# Patient Record
Sex: MALE | Race: WHITE | NOT HISPANIC OR LATINO | Employment: FULL TIME | URBAN - METROPOLITAN AREA
[De-identification: names, ages, dates, MRNs, and addresses within clinical notes are randomized per-mention and may not be internally consistent; named-entity substitution may affect disease eponyms.]

---

## 2021-04-05 ENCOUNTER — APPOINTMENT (OUTPATIENT)
Dept: CT IMAGING | Facility: CLINIC | Age: 36
End: 2021-04-05
Attending: NURSE PRACTITIONER
Payer: COMMERCIAL

## 2021-04-05 ENCOUNTER — HOSPITAL ENCOUNTER (EMERGENCY)
Facility: CLINIC | Age: 36
Discharge: HOME | End: 2021-04-05
Attending: NURSE PRACTITIONER | Admitting: NURSE PRACTITIONER
Payer: COMMERCIAL

## 2021-04-05 VITALS
TEMPERATURE: 97.9 F | WEIGHT: 175 LBS | RESPIRATION RATE: 20 BRPM | DIASTOLIC BLOOD PRESSURE: 75 MMHG | SYSTOLIC BLOOD PRESSURE: 115 MMHG | OXYGEN SATURATION: 98 % | HEART RATE: 79 BPM

## 2021-04-05 DIAGNOSIS — N20.1 URETERAL STONE: ICD-10-CM

## 2021-04-05 PROBLEM — F41.9 ANXIETY AND DEPRESSION: Status: ACTIVE | Noted: 2021-04-05

## 2021-04-05 PROBLEM — M75.41 SHOULDER IMPINGEMENT SYNDROME, RIGHT: Status: ACTIVE | Noted: 2021-04-05

## 2021-04-05 PROBLEM — F32.A ANXIETY AND DEPRESSION: Status: ACTIVE | Noted: 2021-04-05

## 2021-04-05 PROBLEM — M79.605 LOW BACK PAIN RADIATING TO BOTH LEGS: Status: ACTIVE | Noted: 2019-11-01

## 2021-04-05 PROBLEM — G25.81 RESTLESS LEG SYNDROME: Status: ACTIVE | Noted: 2021-04-05

## 2021-04-05 PROBLEM — M79.604 LOW BACK PAIN RADIATING TO BOTH LEGS: Status: ACTIVE | Noted: 2019-11-01

## 2021-04-05 PROBLEM — M51.379 DDD (DEGENERATIVE DISC DISEASE), LUMBOSACRAL: Status: ACTIVE | Noted: 2019-11-01

## 2021-04-05 PROBLEM — E55.9 VITAMIN D DEFICIENCY: Status: ACTIVE | Noted: 2019-02-01

## 2021-04-05 PROBLEM — J30.2 SEASONAL ALLERGIC RHINITIS: Status: ACTIVE | Noted: 2021-04-05

## 2021-04-05 PROBLEM — M54.50 LOW BACK PAIN RADIATING TO BOTH LEGS: Status: ACTIVE | Noted: 2019-11-01

## 2021-04-05 LAB
ALBUMIN SERPL-MCNC: 4.4 G/DL (ref 3.4–5)
ALBUMIN UR-MCNC: NEGATIVE MG/DL
ALP SERPL-CCNC: 92 U/L (ref 40–150)
ALT SERPL W P-5'-P-CCNC: 17 U/L (ref 0–70)
ANION GAP SERPL CALCULATED.3IONS-SCNC: 5 MMOL/L (ref 3–14)
APPEARANCE UR: CLEAR
AST SERPL W P-5'-P-CCNC: 21 U/L (ref 0–45)
BASOPHILS # BLD AUTO: 0.2 10E9/L (ref 0–0.2)
BASOPHILS NFR BLD AUTO: 0.8 %
BILIRUB SERPL-MCNC: 0.7 MG/DL (ref 0.2–1.3)
BILIRUB UR QL STRIP: NEGATIVE
BUN SERPL-MCNC: 21 MG/DL (ref 7–30)
CALCIUM SERPL-MCNC: 9.4 MG/DL (ref 8.5–10.1)
CHLORIDE SERPL-SCNC: 108 MMOL/L (ref 94–109)
CO2 SERPL-SCNC: 27 MMOL/L (ref 20–32)
COLOR UR AUTO: YELLOW
CREAT SERPL-MCNC: 0.88 MG/DL (ref 0.66–1.25)
DIFFERENTIAL METHOD BLD: ABNORMAL
EOSINOPHIL # BLD AUTO: 0.4 10E9/L (ref 0–0.7)
EOSINOPHIL NFR BLD AUTO: 1.9 %
ERYTHROCYTE [DISTWIDTH] IN BLOOD BY AUTOMATED COUNT: 13.2 % (ref 10–15)
GFR SERPL CREATININE-BSD FRML MDRD: >90 ML/MIN/{1.73_M2}
GLUCOSE SERPL-MCNC: 91 MG/DL (ref 70–99)
GLUCOSE UR STRIP-MCNC: NEGATIVE MG/DL
HCT VFR BLD AUTO: 44 % (ref 40–53)
HGB BLD-MCNC: 15.3 G/DL (ref 13.3–17.7)
HGB UR QL STRIP: ABNORMAL
IMM GRANULOCYTES # BLD: 0.1 10E9/L (ref 0–0.4)
IMM GRANULOCYTES NFR BLD: 0.4 %
KETONES UR STRIP-MCNC: 20 MG/DL
LACTATE BLD-SCNC: 1.9 MMOL/L (ref 0.7–2)
LEUKOCYTE ESTERASE UR QL STRIP: NEGATIVE
LYMPHOCYTES # BLD AUTO: 3.5 10E9/L (ref 0.8–5.3)
LYMPHOCYTES NFR BLD AUTO: 17 %
MCH RBC QN AUTO: 31.2 PG (ref 26.5–33)
MCHC RBC AUTO-ENTMCNC: 34.8 G/DL (ref 31.5–36.5)
MCV RBC AUTO: 90 FL (ref 78–100)
MONOCYTES # BLD AUTO: 1.3 10E9/L (ref 0–1.3)
MONOCYTES NFR BLD AUTO: 6.5 %
NEUTROPHILS # BLD AUTO: 15.1 10E9/L (ref 1.6–8.3)
NEUTROPHILS NFR BLD AUTO: 73.4 %
NITRATE UR QL: NEGATIVE
NRBC # BLD AUTO: 0 10*3/UL
NRBC BLD AUTO-RTO: 0 /100
PH UR STRIP: 8 PH (ref 5–7)
PLATELET # BLD AUTO: 395 10E9/L (ref 150–450)
POTASSIUM SERPL-SCNC: 3.5 MMOL/L (ref 3.4–5.3)
PROT SERPL-MCNC: 7.8 G/DL (ref 6.8–8.8)
RBC # BLD AUTO: 4.91 10E12/L (ref 4.4–5.9)
RBC #/AREA URNS AUTO: 170 /HPF (ref 0–2)
SODIUM SERPL-SCNC: 140 MMOL/L (ref 133–144)
SOURCE: ABNORMAL
SP GR UR STRIP: 1.03 (ref 1–1.03)
UROBILINOGEN UR STRIP-MCNC: 0 MG/DL (ref 0–2)
WBC # BLD AUTO: 20.5 10E9/L (ref 4–11)
WBC #/AREA URNS AUTO: 18 /HPF (ref 0–5)

## 2021-04-05 PROCEDURE — 96361 HYDRATE IV INFUSION ADD-ON: CPT | Performed by: NURSE PRACTITIONER

## 2021-04-05 PROCEDURE — 87086 URINE CULTURE/COLONY COUNT: CPT | Performed by: NURSE PRACTITIONER

## 2021-04-05 PROCEDURE — 99285 EMERGENCY DEPT VISIT HI MDM: CPT | Mod: 25 | Performed by: NURSE PRACTITIONER

## 2021-04-05 PROCEDURE — 250N000011 HC RX IP 250 OP 636: Performed by: NURSE PRACTITIONER

## 2021-04-05 PROCEDURE — 83605 ASSAY OF LACTIC ACID: CPT | Performed by: NURSE PRACTITIONER

## 2021-04-05 PROCEDURE — 99285 EMERGENCY DEPT VISIT HI MDM: CPT | Performed by: NURSE PRACTITIONER

## 2021-04-05 PROCEDURE — 96375 TX/PRO/DX INJ NEW DRUG ADDON: CPT | Performed by: NURSE PRACTITIONER

## 2021-04-05 PROCEDURE — 96374 THER/PROPH/DIAG INJ IV PUSH: CPT | Mod: 59 | Performed by: NURSE PRACTITIONER

## 2021-04-05 PROCEDURE — 74177 CT ABD & PELVIS W/CONTRAST: CPT

## 2021-04-05 PROCEDURE — 80053 COMPREHEN METABOLIC PANEL: CPT | Performed by: NURSE PRACTITIONER

## 2021-04-05 PROCEDURE — 85025 COMPLETE CBC W/AUTO DIFF WBC: CPT | Performed by: NURSE PRACTITIONER

## 2021-04-05 PROCEDURE — 258N000003 HC RX IP 258 OP 636: Performed by: NURSE PRACTITIONER

## 2021-04-05 PROCEDURE — 81001 URINALYSIS AUTO W/SCOPE: CPT | Performed by: NURSE PRACTITIONER

## 2021-04-05 RX ORDER — HYDROMORPHONE HYDROCHLORIDE 1 MG/ML
0.5 INJECTION, SOLUTION INTRAMUSCULAR; INTRAVENOUS; SUBCUTANEOUS
Status: DISCONTINUED | OUTPATIENT
Start: 2021-04-05 | End: 2021-04-06 | Stop reason: HOSPADM

## 2021-04-05 RX ORDER — IOPAMIDOL 755 MG/ML
100 INJECTION, SOLUTION INTRAVASCULAR ONCE
Status: COMPLETED | OUTPATIENT
Start: 2021-04-05 | End: 2021-04-05

## 2021-04-05 RX ORDER — ESCITALOPRAM OXALATE 20 MG/1
20 TABLET ORAL AT BEDTIME
COMMUNITY
Start: 2021-03-22

## 2021-04-05 RX ORDER — KETOCONAZOLE 20 MG/ML
SHAMPOO TOPICAL DAILY PRN
COMMUNITY
Start: 2019-12-30

## 2021-04-05 RX ORDER — ONDANSETRON 2 MG/ML
4 INJECTION INTRAMUSCULAR; INTRAVENOUS EVERY 30 MIN PRN
Status: DISCONTINUED | OUTPATIENT
Start: 2021-04-05 | End: 2021-04-06 | Stop reason: HOSPADM

## 2021-04-05 RX ORDER — OXYCODONE HYDROCHLORIDE 5 MG/1
5-10 TABLET ORAL EVERY 4 HOURS PRN
Status: DISCONTINUED | OUTPATIENT
Start: 2021-04-05 | End: 2021-04-06 | Stop reason: HOSPADM

## 2021-04-05 RX ORDER — ROPINIROLE 1 MG/1
2 TABLET, FILM COATED ORAL AT BEDTIME
COMMUNITY
Start: 2020-07-09

## 2021-04-05 RX ORDER — SILDENAFIL 100 MG/1
50-100 TABLET, FILM COATED ORAL
COMMUNITY
Start: 2021-03-23

## 2021-04-05 RX ORDER — OXYCODONE HYDROCHLORIDE 5 MG/1
5-10 TABLET ORAL EVERY 4 HOURS PRN
Qty: 14 TABLET | Refills: 0 | Status: SHIPPED | OUTPATIENT
Start: 2021-04-05 | End: 2023-08-28

## 2021-04-05 RX ORDER — SODIUM CHLORIDE 9 MG/ML
INJECTION, SOLUTION INTRAVENOUS CONTINUOUS
Status: DISCONTINUED | OUTPATIENT
Start: 2021-04-05 | End: 2021-04-06 | Stop reason: HOSPADM

## 2021-04-05 RX ORDER — ONDANSETRON 4 MG/1
4 TABLET, ORALLY DISINTEGRATING ORAL EVERY 6 HOURS PRN
Qty: 2 TABLET | Refills: 0 | Status: SHIPPED | OUTPATIENT
Start: 2021-04-05 | End: 2023-08-28

## 2021-04-05 RX ORDER — LORAZEPAM 1 MG/1
TABLET ORAL
COMMUNITY
Start: 2021-01-06 | End: 2023-08-28

## 2021-04-05 RX ORDER — ONDANSETRON 4 MG/1
4 TABLET, FILM COATED ORAL EVERY 6 HOURS PRN
Qty: 10 TABLET | Refills: 0 | Status: SHIPPED | OUTPATIENT
Start: 2021-04-05 | End: 2023-08-28

## 2021-04-05 RX ORDER — OXYCODONE HYDROCHLORIDE 5 MG/1
5-10 TABLET ORAL EVERY 4 HOURS PRN
Qty: 4 TABLET | Refills: 0 | Status: SHIPPED | OUTPATIENT
Start: 2021-04-05 | End: 2023-08-28

## 2021-04-05 RX ORDER — FLUTICASONE PROPIONATE 50 MCG
2 SPRAY, SUSPENSION (ML) NASAL AT BEDTIME
COMMUNITY
Start: 2021-03-22

## 2021-04-05 RX ORDER — OXYCODONE HYDROCHLORIDE 5 MG/1
5-10 TABLET ORAL EVERY 4 HOURS PRN
Qty: 12 TABLET | Refills: 0 | Status: SHIPPED | OUTPATIENT
Start: 2021-04-05 | End: 2021-04-05

## 2021-04-05 RX ORDER — ONDANSETRON 4 MG/1
4 TABLET, ORALLY DISINTEGRATING ORAL EVERY 6 HOURS PRN
Status: DISCONTINUED | OUTPATIENT
Start: 2021-04-05 | End: 2021-04-06 | Stop reason: HOSPADM

## 2021-04-05 RX ORDER — KETOROLAC TROMETHAMINE 30 MG/ML
30 INJECTION, SOLUTION INTRAMUSCULAR; INTRAVENOUS ONCE
Status: COMPLETED | OUTPATIENT
Start: 2021-04-05 | End: 2021-04-05

## 2021-04-05 RX ADMIN — SODIUM CHLORIDE 1000 ML: 9 INJECTION, SOLUTION INTRAVENOUS at 21:46

## 2021-04-05 RX ADMIN — HYDROMORPHONE HYDROCHLORIDE 0.5 MG: 1 INJECTION, SOLUTION INTRAMUSCULAR; INTRAVENOUS; SUBCUTANEOUS at 23:27

## 2021-04-05 RX ADMIN — KETOROLAC TROMETHAMINE 30 MG: 30 INJECTION, SOLUTION INTRAMUSCULAR at 21:51

## 2021-04-05 RX ADMIN — IOPAMIDOL 80 ML: 755 INJECTION, SOLUTION INTRAVENOUS at 22:05

## 2021-04-05 RX ADMIN — ONDANSETRON 4 MG: 2 INJECTION INTRAMUSCULAR; INTRAVENOUS at 21:50

## 2021-04-05 ASSESSMENT — ENCOUNTER SYMPTOMS
ABDOMINAL PAIN: 1
VOMITING: 1
SHORTNESS OF BREATH: 0
FEVER: 0
BACK PAIN: 0
FLANK PAIN: 0
NAUSEA: 1
HEMATURIA: 0
NEUROLOGICAL NEGATIVE: 1
DIARRHEA: 0
CHILLS: 1
COUGH: 0
FREQUENCY: 0
APPETITE CHANGE: 1

## 2021-04-06 NOTE — ED PROVIDER NOTES
History     Chief Complaint   Patient presents with     Abdominal Pain     HPI  Avery Wiseman is a 36 year old male with history of diverticulosis, anxiety/depression, GERD, BORA, and restless leg syndrome who presents to the emergency department for sudden onset of severe right lower quadrant abdominal pain. Pain started 30 minutes prior to arrival here. Prior to this patient states he was feeling fine, without abdominal pain or feeling ill. At time of arrival here he reports chills. Pain radiates into his penis. He feels like he needs to have a bowel movement, but is unable.  Patient an episode of diverticulitis 10 years ago.  No history of kidney stones.  Former smoker.    Allergies:  Allergies   Allergen Reactions     Bee Venom Hives     Methylphenidate Hives     Sertraline Other (See Comments)     Unable to reach climax     Trazodone Nausea and Vomiting       Problem List:    Patient Active Problem List    Diagnosis Date Noted     Anxiety and depression 04/05/2021     Priority: Medium     Restless leg syndrome 04/05/2021     Priority: Medium     Seasonal allergic rhinitis 04/05/2021     Priority: Medium     Shoulder impingement syndrome, right 04/05/2021     Priority: Medium     DDD (degenerative disc disease), lumbosacral 11/01/2019     Priority: Medium     Formatting of this note might be different from the original.  MRI 11/2019       Low back pain radiating to both legs 11/01/2019     Priority: Medium     Vitamin D deficiency 02/01/2019     Priority: Medium     BORA on CPAP 09/11/2014     Priority: Medium     GERD (gastroesophageal reflux disease) 08/06/2014     Priority: Medium     Insomnia 05/05/2014     Priority: Medium        Past Medical History:    History reviewed. No pertinent past medical history.    Past Surgical History:    No past surgical history on file.    Family History:    No family history on file.    Social History:  Marital Status:  Single [1]  Social History     Tobacco Use      Smoking status: None   Substance Use Topics     Alcohol use: None     Drug use: None        Medications:    escitalopram (LEXAPRO) 20 MG tablet  fluticasone (FLONASE) 50 MCG/ACT nasal spray  ketoconazole (NIZORAL) 2 % external shampoo  LORazepam (ATIVAN) 1 MG tablet  ondansetron (ZOFRAN ODT) 4 MG ODT tab  ondansetron (ZOFRAN) 4 MG tablet  oxyCODONE (ROXICODONE) 5 MG tablet  oxyCODONE (ROXICODONE) 5 MG tablet  rOPINIRole (REQUIP) 1 MG tablet  sildenafil (VIAGRA) 100 MG tablet          Review of Systems   Constitutional: Positive for appetite change and chills. Negative for fever.   HENT: Negative.    Respiratory: Negative for cough and shortness of breath.    Cardiovascular: Negative for chest pain.   Gastrointestinal: Positive for abdominal pain, nausea and vomiting. Negative for diarrhea.   Genitourinary: Positive for penile pain. Negative for flank pain, frequency and hematuria.   Musculoskeletal: Negative for back pain.   Skin: Negative.    Neurological: Negative.    All other systems reviewed and are negative.      Physical Exam   BP: (!) 152/95  Pulse: 101  Temp: 97.7  F (36.5  C)  Resp: 24  Weight: 79.4 kg (175 lb)  SpO2: 98 %      Physical Exam  Constitutional:       General: He is in acute distress.      Appearance: Normal appearance. He is well-developed. He is ill-appearing.   HENT:      Head: Normocephalic and atraumatic.      Nose: Nose normal.      Mouth/Throat:      Pharynx: No oropharyngeal exudate.   Eyes:      General: No scleral icterus.     Conjunctiva/sclera: Conjunctivae normal.   Cardiovascular:      Rate and Rhythm: Regular rhythm. Tachycardia present.      Heart sounds: Normal heart sounds. No murmur.   Pulmonary:      Effort: Pulmonary effort is normal. No respiratory distress.      Breath sounds: Normal breath sounds.   Abdominal:      Tenderness: There is abdominal tenderness in the right lower quadrant, suprapubic area and left lower quadrant.   Musculoskeletal: Normal range of motion.    Skin:     General: Skin is warm and dry.      Findings: No rash.   Neurological:      General: No focal deficit present.      Mental Status: He is alert and oriented to person, place, and time.         ED Course        Procedures            Results for orders placed or performed during the hospital encounter of 04/05/21 (from the past 24 hour(s))   CBC with platelets differential   Result Value Ref Range    WBC 20.5 (H) 4.0 - 11.0 10e9/L    RBC Count 4.91 4.4 - 5.9 10e12/L    Hemoglobin 15.3 13.3 - 17.7 g/dL    Hematocrit 44.0 40.0 - 53.0 %    MCV 90 78 - 100 fl    MCH 31.2 26.5 - 33.0 pg    MCHC 34.8 31.5 - 36.5 g/dL    RDW 13.2 10.0 - 15.0 %    Platelet Count 395 150 - 450 10e9/L    Diff Method Automated Method     % Neutrophils 73.4 %    % Lymphocytes 17.0 %    % Monocytes 6.5 %    % Eosinophils 1.9 %    % Basophils 0.8 %    % Immature Granulocytes 0.4 %    Nucleated RBCs 0 0 /100    Absolute Neutrophil 15.1 (H) 1.6 - 8.3 10e9/L    Absolute Lymphocytes 3.5 0.8 - 5.3 10e9/L    Absolute Monocytes 1.3 0.0 - 1.3 10e9/L    Absolute Eosinophils 0.4 0.0 - 0.7 10e9/L    Absolute Basophils 0.2 0.0 - 0.2 10e9/L    Abs Immature Granulocytes 0.1 0 - 0.4 10e9/L    Absolute Nucleated RBC 0.0    Comprehensive metabolic panel   Result Value Ref Range    Sodium 140 133 - 144 mmol/L    Potassium 3.5 3.4 - 5.3 mmol/L    Chloride 108 94 - 109 mmol/L    Carbon Dioxide 27 20 - 32 mmol/L    Anion Gap 5 3 - 14 mmol/L    Glucose 91 70 - 99 mg/dL    Urea Nitrogen 21 7 - 30 mg/dL    Creatinine 0.88 0.66 - 1.25 mg/dL    GFR Estimate >90 >60 mL/min/[1.73_m2]    GFR Estimate If Black >90 >60 mL/min/[1.73_m2]    Calcium 9.4 8.5 - 10.1 mg/dL    Bilirubin Total 0.7 0.2 - 1.3 mg/dL    Albumin 4.4 3.4 - 5.0 g/dL    Protein Total 7.8 6.8 - 8.8 g/dL    Alkaline Phosphatase 92 40 - 150 U/L    ALT 17 0 - 70 U/L    AST 21 0 - 45 U/L   Lactic acid whole blood   Result Value Ref Range    Lactic Acid 1.9 0.7 - 2.0 mmol/L   CT Abdomen Pelvis w Contrast     Narrative    EXAM: CT ABDOMEN PELVIS W CONTRAST  LOCATION: [facility]  DATE/TIME: 4/5/2021 9:53 PM    INDICATION: RLQ abdominal pain  COMPARISON: None.  TECHNIQUE: CT scan of the abdomen and pelvis was performed following injection of IV contrast. Multiplanar reformats were obtained. Dose reduction techniques were used.  CONTRAST: Isovue 370, 80mL    FINDINGS:   LOWER CHEST: Normal.    HEPATOBILIARY: 1.4 cm indeterminate lesion in the posterior right lobe of the liver.    PANCREAS: Normal.    SPLEEN: Normal.    ADRENAL GLANDS: Normal.    KIDNEYS/BLADDER: There is mild dilatation of the right renal collecting system and ureter into the pelvis where there is a 2 mm ureterovesical junction stone. There is delayed nephrogram on the right consistent with obstruction. There is single tiny   stone in the upper pole of the right kidney. The left kidney is normal in appearance.    BOWEL: There is no bowel obstruction or inflammation. The appendix is within normal limits. No free intraperitoneal gas or fluid.    LYMPH NODES: Normal.    VASCULATURE: Unremarkable.    PELVIC ORGANS: Normal.    MUSCULOSKELETAL: Degenerative disease at the lumbosacral junction.      Impression    IMPRESSION:   1.  Mild right hydronephrosis caused by a 2 mm ureterovesical junction stone.  2.  Single tiny stone within the right kidney.   UA with Microscopic   Result Value Ref Range    Color Urine Yellow     Appearance Urine Clear     Glucose Urine Negative NEG^Negative mg/dL    Bilirubin Urine Negative NEG^Negative    Ketones Urine 20 (A) NEG^Negative mg/dL    Specific Gravity Urine 1.034 1.003 - 1.035    Blood Urine Large (A) NEG^Negative    pH Urine 8.0 (H) 5.0 - 7.0 pH    Protein Albumin Urine Negative NEG^Negative mg/dL    Urobilinogen mg/dL 0.0 0.0 - 2.0 mg/dL    Nitrite Urine Negative NEG^Negative    Leukocyte Esterase Urine Negative NEG^Negative    Source Midstream Urine     WBC Urine 18 (H) 0 - 5 /HPF    RBC Urine 170 (H) 0  - 2 /HPF       Medications   0.9% sodium chloride BOLUS (0 mLs Intravenous Stopped 4/5/21 2329)     Followed by   sodium chloride 0.9% infusion (has no administration in time range)   ondansetron (ZOFRAN) injection 4 mg (4 mg Intravenous Given 4/5/21 2150)   HYDROmorphone (PF) (DILAUDID) injection 0.5 mg (0.5 mg Intravenous Given 4/5/21 2327)   oxyCODONE (ROXICODONE) tablet 5-10 mg (has no administration in time range)   ondansetron (ZOFRAN-ODT) ODT tab 4 mg (has no administration in time range)   ketorolac (TORADOL) injection 30 mg (30 mg Intravenous Given 4/5/21 2151)   sodium chloride (PF) 0.9% PF flush 100 mL (60 mLs Intracatheter Given 4/5/21 2205)   sodium chloride (PF) 0.9% PF flush 3 mL (10 mLs Intravenous Given 4/5/21 2204)   iopamidol (ISOVUE-370) solution 100 mL (80 mLs Intravenous Given 4/5/21 2205)       Assessments & Plan (with Medical Decision Making)     36 year old male with history of diverticulosis, anxiety/depression, GERD, BORA, and restless leg syndrome who presents to the emergency department for sudden onset of severe right lower quadrant abdominal pain. Pain started 30 minutes prior to arrival here.    On exam patient is in distress/pain. Afebrile. RLQ abdominal tenderness. Otherwise abdomen is soft without distention. Leukocytosis with WBC 20.5 . electrolytes are normal.  Kidney function labs are normal.  LFTs normal.  Lactic acid is normal.  UA reveals 20 ketones, large blood, 18 WBCs, and 170 RBCs.  No nitrites.  No leuk esterase.  Urine culture is pending.  Abdominal/pelvis CT obtained and reveals mild right hydronephrosis related to a 2 mm right UVJ stone. No fat stranding concerning for pyelonephritis. I discussed the lab and imaging findings with patient. We discussed my concern for his elevated WBC in the setting of a kidney stone. This can be elevated from pain reaction.  I have lower suspicion for infection but I did instruct patient to have a low threshold for returning to the  emergency department immediately if he develops fever or feeling ill in any way.  Patient was given IV normal saline 1 L bolus, IV Zofran, and IV Toradol.  His pain significantly improved and was nearly gone.  His pain started to increase again and he was given a dose of IV Dilaudid with resolution of his pain.  Patient discharged home with the following plan:  Kidney stone in your right ureter appears to be close to dropping into the bladder.   Can take up to a week to completely pass kidney stone.   Tylenol 650 mg every 4-6 hours as needed for pain.  Ibuprofen 400-600 mg every 6-8 hours as needed for pain  (take with food, stop if causing stomach pains.)  Oxycodone 1-2 tablets every 4 hours as needed for moderate-severe pain.  Zofran 4 mg every 6 hours as needed for nausea/vomiting.  Return to the emergency department immediately for fevers, vomiting, not tolerating pain, or feeling ill.    I have reviewed the nursing notes.    I have reviewed the findings, diagnosis, plan and need for follow up with the patient.    Discharge Medication List as of 4/5/2021 11:37 PM      START taking these medications    Details   ondansetron (ZOFRAN ODT) 4 MG ODT tab Take 1 tablet (4 mg) by mouth every 6 hours as needed for nausea, Disp-2 tablet, R-0, Local Print      ondansetron (ZOFRAN) 4 MG tablet Take 1 tablet (4 mg) by mouth every 6 hours as needed for nausea, Disp-10 tablet, R-0, Local Print             Final diagnoses:   Ureteral stone       4/5/2021   [facility] EMERGENCY DEPT     Christine, ANA Gonzalez CNP  04/06/21 0011

## 2021-04-06 NOTE — DISCHARGE INSTRUCTIONS
Kidney stone in your right ureter appears to be close to dropping into the bladder.   Can take up to a week to completely pass kidney stone.   Tylenol 650 mg every 4-6 hours as needed for pain.  Ibuprofen 400-600 mg every 6-8 hours as needed for pain  (take with food, stop if causing stomach pains.)  Oxycodone 1-2 tablets every 4 hours as needed for moderate-severe pain.  Zofran 4 mg every 6 hours as needed for nausea/vomiting.  Return to the emergency department immediately for fevers, vomiting, not tolerating pain, or feeling ill.

## 2021-04-07 LAB
BACTERIA SPEC CULT: NORMAL
Lab: NORMAL
SPECIMEN SOURCE: NORMAL

## 2021-04-07 NOTE — RESULT ENCOUNTER NOTE
Final urine culture report is NEGATIVE  No change in treatment per [facility] ED Lab Result Urine Culture protocol.

## 2021-05-16 ENCOUNTER — HEALTH MAINTENANCE LETTER (OUTPATIENT)
Age: 36
End: 2021-05-16

## 2021-09-05 ENCOUNTER — HEALTH MAINTENANCE LETTER (OUTPATIENT)
Age: 36
End: 2021-09-05

## 2022-06-12 ENCOUNTER — HEALTH MAINTENANCE LETTER (OUTPATIENT)
Age: 37
End: 2022-06-12

## 2022-10-23 ENCOUNTER — HEALTH MAINTENANCE LETTER (OUTPATIENT)
Age: 37
End: 2022-10-23

## 2023-06-18 ENCOUNTER — HEALTH MAINTENANCE LETTER (OUTPATIENT)
Age: 38
End: 2023-06-18

## 2023-08-28 ENCOUNTER — APPOINTMENT (OUTPATIENT)
Dept: CT IMAGING | Facility: CLINIC | Age: 38
End: 2023-08-28
Attending: EMERGENCY MEDICINE
Payer: COMMERCIAL

## 2023-08-28 ENCOUNTER — HOSPITAL ENCOUNTER (EMERGENCY)
Facility: CLINIC | Age: 38
Discharge: HOME | End: 2023-08-28
Attending: EMERGENCY MEDICINE | Admitting: EMERGENCY MEDICINE
Payer: COMMERCIAL

## 2023-08-28 VITALS
RESPIRATION RATE: 20 BRPM | HEIGHT: 71 IN | WEIGHT: 164 LBS | BODY MASS INDEX: 22.96 KG/M2 | TEMPERATURE: 97.6 F | HEART RATE: 76 BPM | DIASTOLIC BLOOD PRESSURE: 77 MMHG | SYSTOLIC BLOOD PRESSURE: 122 MMHG | OXYGEN SATURATION: 97 %

## 2023-08-28 DIAGNOSIS — N20.1 URETEROLITHIASIS: ICD-10-CM

## 2023-08-28 LAB
ALBUMIN SERPL BCG-MCNC: 4.8 G/DL (ref 3.5–5.2)
ALBUMIN UR-MCNC: 30 MG/DL
ALP SERPL-CCNC: 84 U/L (ref 40–129)
ALT SERPL W P-5'-P-CCNC: 20 U/L (ref 0–70)
AMORPH CRY #/AREA URNS HPF: ABNORMAL /HPF
ANION GAP SERPL CALCULATED.3IONS-SCNC: 19 MMOL/L (ref 7–15)
APPEARANCE UR: ABNORMAL
AST SERPL W P-5'-P-CCNC: 29 U/L (ref 0–45)
BACTERIA #/AREA URNS HPF: ABNORMAL /HPF
BASOPHILS # BLD AUTO: 0.1 10E3/UL (ref 0–0.2)
BASOPHILS NFR BLD AUTO: 1 %
BILIRUB SERPL-MCNC: 0.5 MG/DL
BILIRUB UR QL STRIP: NEGATIVE
BUN SERPL-MCNC: 24.5 MG/DL (ref 6–20)
CALCIUM SERPL-MCNC: 9.9 MG/DL (ref 8.6–10)
CHLORIDE SERPL-SCNC: 98 MMOL/L (ref 98–107)
COLOR UR AUTO: YELLOW
CREAT SERPL-MCNC: 0.85 MG/DL (ref 0.67–1.17)
DEPRECATED HCO3 PLAS-SCNC: 21 MMOL/L (ref 22–29)
EOSINOPHIL # BLD AUTO: 0.3 10E3/UL (ref 0–0.7)
EOSINOPHIL NFR BLD AUTO: 2 %
ERYTHROCYTE [DISTWIDTH] IN BLOOD BY AUTOMATED COUNT: 13.2 % (ref 10–15)
GFR SERPL CREATININE-BSD FRML MDRD: >90 ML/MIN/1.73M2
GLUCOSE SERPL-MCNC: 113 MG/DL (ref 70–99)
GLUCOSE UR STRIP-MCNC: NEGATIVE MG/DL
HCT VFR BLD AUTO: 42.6 % (ref 40–53)
HGB BLD-MCNC: 14.7 G/DL (ref 13.3–17.7)
HGB UR QL STRIP: NEGATIVE
IMM GRANULOCYTES # BLD: 0.1 10E3/UL
IMM GRANULOCYTES NFR BLD: 0 %
KETONES UR STRIP-MCNC: NEGATIVE MG/DL
LEUKOCYTE ESTERASE UR QL STRIP: NEGATIVE
LYMPHOCYTES # BLD AUTO: 3.8 10E3/UL (ref 0.8–5.3)
LYMPHOCYTES NFR BLD AUTO: 28 %
MCH RBC QN AUTO: 30.2 PG (ref 26.5–33)
MCHC RBC AUTO-ENTMCNC: 34.5 G/DL (ref 31.5–36.5)
MCV RBC AUTO: 88 FL (ref 78–100)
MONOCYTES # BLD AUTO: 1.2 10E3/UL (ref 0–1.3)
MONOCYTES NFR BLD AUTO: 9 %
MUCOUS THREADS #/AREA URNS LPF: PRESENT /LPF
NEUTROPHILS # BLD AUTO: 8.3 10E3/UL (ref 1.6–8.3)
NEUTROPHILS NFR BLD AUTO: 60 %
NITRATE UR QL: NEGATIVE
NRBC # BLD AUTO: 0 10E3/UL
NRBC BLD AUTO-RTO: 0 /100
PH UR STRIP: 7 [PH] (ref 5–7)
PLATELET # BLD AUTO: 404 10E3/UL (ref 150–450)
POTASSIUM SERPL-SCNC: 3.6 MMOL/L (ref 3.4–5.3)
PROT SERPL-MCNC: 7.5 G/DL (ref 6.4–8.3)
RBC # BLD AUTO: 4.86 10E6/UL (ref 4.4–5.9)
RBC URINE: 12 /HPF
SODIUM SERPL-SCNC: 138 MMOL/L (ref 136–145)
SP GR UR STRIP: 1.02 (ref 1–1.03)
UROBILINOGEN UR STRIP-MCNC: NORMAL MG/DL
WBC # BLD AUTO: 13.7 10E3/UL (ref 4–11)
WBC URINE: 1 /HPF

## 2023-08-28 PROCEDURE — 96361 HYDRATE IV INFUSION ADD-ON: CPT | Performed by: EMERGENCY MEDICINE

## 2023-08-28 PROCEDURE — 85014 HEMATOCRIT: CPT | Performed by: EMERGENCY MEDICINE

## 2023-08-28 PROCEDURE — 36415 COLL VENOUS BLD VENIPUNCTURE: CPT | Performed by: EMERGENCY MEDICINE

## 2023-08-28 PROCEDURE — 96376 TX/PRO/DX INJ SAME DRUG ADON: CPT | Performed by: EMERGENCY MEDICINE

## 2023-08-28 PROCEDURE — 250N000011 HC RX IP 250 OP 636: Performed by: EMERGENCY MEDICINE

## 2023-08-28 PROCEDURE — 99284 EMERGENCY DEPT VISIT MOD MDM: CPT | Performed by: EMERGENCY MEDICINE

## 2023-08-28 PROCEDURE — 80053 COMPREHEN METABOLIC PANEL: CPT | Performed by: EMERGENCY MEDICINE

## 2023-08-28 PROCEDURE — 96374 THER/PROPH/DIAG INJ IV PUSH: CPT | Performed by: EMERGENCY MEDICINE

## 2023-08-28 PROCEDURE — 99285 EMERGENCY DEPT VISIT HI MDM: CPT | Mod: 25 | Performed by: EMERGENCY MEDICINE

## 2023-08-28 PROCEDURE — 96375 TX/PRO/DX INJ NEW DRUG ADDON: CPT | Performed by: EMERGENCY MEDICINE

## 2023-08-28 PROCEDURE — 74176 CT ABD & PELVIS W/O CONTRAST: CPT

## 2023-08-28 PROCEDURE — 81001 URINALYSIS AUTO W/SCOPE: CPT | Performed by: EMERGENCY MEDICINE

## 2023-08-28 PROCEDURE — 258N000003 HC RX IP 258 OP 636: Performed by: EMERGENCY MEDICINE

## 2023-08-28 RX ORDER — METHYLPREDNISOLONE 4 MG
TABLET, DOSE PACK ORAL
COMMUNITY
Start: 2023-01-30 | End: 2023-11-27

## 2023-08-28 RX ORDER — ONDANSETRON 2 MG/ML
4 INJECTION INTRAMUSCULAR; INTRAVENOUS EVERY 30 MIN PRN
Status: DISCONTINUED | OUTPATIENT
Start: 2023-08-28 | End: 2023-08-28 | Stop reason: HOSPADM

## 2023-08-28 RX ORDER — ONDANSETRON 4 MG/1
4 TABLET, ORALLY DISINTEGRATING ORAL EVERY 8 HOURS PRN
Qty: 15 TABLET | Refills: 0 | Status: SHIPPED | OUTPATIENT
Start: 2023-08-28

## 2023-08-28 RX ORDER — DEXTROAMPHETAMINE SULFATE, DEXTROAMPHETAMINE SACCHARATE, AMPHETAMINE SULFATE AND AMPHETAMINE ASPARTATE 5; 5; 5; 5 MG/1; MG/1; MG/1; MG/1
20 CAPSULE, EXTENDED RELEASE ORAL EVERY MORNING
COMMUNITY

## 2023-08-28 RX ORDER — KETOROLAC TROMETHAMINE 15 MG/ML
15 INJECTION, SOLUTION INTRAMUSCULAR; INTRAVENOUS ONCE
Status: COMPLETED | OUTPATIENT
Start: 2023-08-28 | End: 2023-08-28

## 2023-08-28 RX ORDER — HYDROMORPHONE HYDROCHLORIDE 1 MG/ML
0.5 INJECTION, SOLUTION INTRAMUSCULAR; INTRAVENOUS; SUBCUTANEOUS EVERY 30 MIN PRN
Status: COMPLETED | OUTPATIENT
Start: 2023-08-28 | End: 2023-08-28

## 2023-08-28 RX ORDER — DEXTROAMPHETAMINE SACCHARATE, AMPHETAMINE ASPARTATE, DEXTROAMPHETAMINE SULFATE AND AMPHETAMINE SULFATE 2.5; 2.5; 2.5; 2.5 MG/1; MG/1; MG/1; MG/1
20 TABLET ORAL
COMMUNITY
Start: 2023-08-16

## 2023-08-28 RX ORDER — OXYCODONE HYDROCHLORIDE 5 MG/1
5 TABLET ORAL EVERY 6 HOURS PRN
Qty: 12 TABLET | Refills: 0 | Status: SHIPPED | OUTPATIENT
Start: 2023-08-28 | End: 2023-08-31

## 2023-08-28 RX ADMIN — HYDROMORPHONE HYDROCHLORIDE 0.5 MG: 1 INJECTION, SOLUTION INTRAMUSCULAR; INTRAVENOUS; SUBCUTANEOUS at 14:34

## 2023-08-28 RX ADMIN — SODIUM CHLORIDE 1000 ML: 9 INJECTION, SOLUTION INTRAVENOUS at 12:34

## 2023-08-28 RX ADMIN — KETOROLAC TROMETHAMINE 15 MG: 15 INJECTION, SOLUTION INTRAMUSCULAR; INTRAVENOUS at 12:30

## 2023-08-28 RX ADMIN — HYDROMORPHONE HYDROCHLORIDE 0.5 MG: 1 INJECTION, SOLUTION INTRAMUSCULAR; INTRAVENOUS; SUBCUTANEOUS at 13:14

## 2023-08-28 RX ADMIN — HYDROMORPHONE HYDROCHLORIDE 0.5 MG: 1 INJECTION, SOLUTION INTRAMUSCULAR; INTRAVENOUS; SUBCUTANEOUS at 12:38

## 2023-08-28 RX ADMIN — ONDANSETRON 4 MG: 2 INJECTION INTRAMUSCULAR; INTRAVENOUS at 12:32

## 2023-08-28 ASSESSMENT — ACTIVITIES OF DAILY LIVING (ADL): ADLS_ACUITY_SCORE: 35

## 2023-08-28 NOTE — MEDICATION SCRIBE - ADMISSION MEDICATION HISTORY
Medication Scribe Admission Medication History    Admission medication history is complete. The information provided in this note is only as accurate as the sources available at the time of the update.    Medication reconciliation/reorder completed by provider prior to medication history? No    Information Source(s): Patient via in-person    Pertinent Information: s/o Alicia also helpful    Changes made to PTA medication list:  Added: Adderall, Adderall XR, CPAP, medrol dose pack  Deleted: ativan, zofran, oxycodone  Changed: lexapro to 20mg hs    Medication Affordability:  Not including over the counter (OTC) medications, was there a time in the past 3 months when you did not take your medications as prescribed because of cost?: No    Allergies reviewed with patient and updates made in EHR: yes    Medication History Completed By: MILLIE MUNGUIA 8/28/2023 2:10 PM    Prior to Admission medications    Medication Sig Last Dose Taking? Auth Provider Long Term End Date   ADDERALL XR 20 MG 24 hr capsule Take 20 mg by mouth every morning 8/28/2023 at am Yes Reported, Patient     amphetamine-dextroamphetamine (ADDERALL) 10 MG tablet Take 20 mg by mouth daily at 2 pm 8/27/2023 at 1400 Yes Reported, Patient     escitalopram (LEXAPRO) 20 MG tablet Take 20 mg by mouth At Bedtime 8/27/2023 at hs Yes Reported, Patient Yes    fluticasone (FLONASE) 50 MCG/ACT nasal spray Spray 2 sprays into both nostrils At Bedtime 8/27/2023 at hs Yes Reported, Patient     ketoconazole (NIZORAL) 2 % external shampoo Apply topically daily as needed for other or itching (flaking) Past Week at am Yes Reported, Patient     MEDROL 4 MG tablet therapy pack As directed on package as needed for back pain flare Past Week at am done Yes Reported, Patient  11/27/23   OTHER MEDICAL SUPPLIES At Bedtime C-PAP 8/27/2023 at hs Yes Reported, Patient     rOPINIRole (REQUIP) 1 MG tablet Take 2 mg by mouth At Bedtime 8/27/2023 at hs Yes Reported, Patient Yes     sildenafil (VIAGRA) 100 MG tablet Take  mg by mouth Unknown at unkn Yes Reported, Patient Yes

## 2023-08-28 NOTE — DISCHARGE INSTRUCTIONS
As discussed you have a very small kidney stone, 0.3 cm at the point where the ureter joins the bladder.  This is a frequent spot for them to temporarily become stuck.  Usually will pass this on your own.  Drink plenty water and take the pain medicines as needed.  You can also take ibuprofen.  Return to the ER if new or worsening symptoms.  Your labs looked reassuring.  You had did not have any signs of an infection on your urinalysis.  It was a pleasure to see you today.  I hope this passes quickly.

## 2023-08-28 NOTE — ED PROVIDER NOTES
History     Chief Complaint   Patient presents with    Groin Swelling     HPI  Avery Wiseman is a 38 year old male who with a history of ureterolithiasis presents to the emergency department secondary to abrupt onset of right-sided groin and testicular discomfort.  It started abruptly upon awakening today.  It is associated difficulty passing urine but no hematuria, dysuria, vomiting.  He is diaphoretic and has significant mount of pain and has right lower quadrant abdominal pain as well.  He has not had any vomiting or fever.  He is not sure whether or not his testicle is swollen.     Allergies:  Allergies   Allergen Reactions    Bee Venom Hives    Sertraline Other (See Comments)     Unable to reach climax    Trazodone Nausea and Vomiting       Problem List:    Patient Active Problem List    Diagnosis Date Noted    Anxiety and depression 04/05/2021     Priority: Medium    Restless leg syndrome 04/05/2021     Priority: Medium    Seasonal allergic rhinitis 04/05/2021     Priority: Medium    Shoulder impingement syndrome, right 04/05/2021     Priority: Medium    DDD (degenerative disc disease), lumbosacral 11/01/2019     Priority: Medium     Formatting of this note might be different from the original.  MRI 11/2019      Low back pain radiating to both legs 11/01/2019     Priority: Medium    Vitamin D deficiency 02/01/2019     Priority: Medium    BORA on CPAP 09/11/2014     Priority: Medium    GERD (gastroesophageal reflux disease) 08/06/2014     Priority: Medium    Insomnia 05/05/2014     Priority: Medium        Past Medical History:    No past medical history on file.    Past Surgical History:    No past surgical history on file.    Family History:    No family history on file.    Social History:  Marital Status:  Single [1]        Medications:    ADDERALL XR 20 MG 24 hr capsule  amphetamine-dextroamphetamine (ADDERALL) 10 MG tablet  escitalopram (LEXAPRO) 20 MG tablet  fluticasone (FLONASE) 50 MCG/ACT nasal  "spray  ketoconazole (NIZORAL) 2 % external shampoo  MEDROL 4 MG tablet therapy pack  ondansetron (ZOFRAN ODT) 4 MG ODT tab  OTHER MEDICAL SUPPLIES  oxyCODONE (ROXICODONE) 5 MG tablet  rOPINIRole (REQUIP) 1 MG tablet  sildenafil (VIAGRA) 100 MG tablet          Review of Systems   All other systems reviewed and are negative.      Physical Exam   BP: (!) 159/100  Pulse: 55  Temp: 97.6  F (36.4  C)  Resp: 20  Height: 180.3 cm (5' 11\")  Weight: 74.4 kg (164 lb)  SpO2: 100 %      Physical Exam  Vitals and nursing note reviewed.   Constitutional:       General: He is not in acute distress.     Appearance: He is well-developed. He is not diaphoretic.      Comments: Diaphoretic, in pain    HENT:      Head: Normocephalic and atraumatic.      Nose: Nose normal.      Mouth/Throat:      Mouth: Mucous membranes are moist.   Eyes:      General: No scleral icterus.  Cardiovascular:      Rate and Rhythm: Normal rate and regular rhythm.   Pulmonary:      Effort: No respiratory distress.      Breath sounds: No wheezing or rales.   Abdominal:      Tenderness: There is abdominal tenderness.      Comments: Rlq ttp    Genitourinary:     Penis: Normal.       Testes: Normal.   Musculoskeletal:         General: Normal range of motion.      Cervical back: Normal range of motion and neck supple.      Right lower leg: No edema.      Left lower leg: No edema.   Skin:     General: Skin is warm and dry.      Findings: No rash.   Neurological:      General: No focal deficit present.      Mental Status: He is alert and oriented to person, place, and time.   Psychiatric:      Comments: Appears anxious and in pain          ED Course                 Procedures                Results for orders placed or performed during the hospital encounter of 08/28/23 (from the past 24 hour(s))   CBC with platelets differential    Narrative    The following orders were created for panel order CBC with platelets differential.  Procedure                               " Abnormality         Status                     ---------                               -----------         ------                     CBC with platelets and d...[[id number]]  Abnormal            Final result                 Please view results for these tests on the individual orders.   Comprehensive metabolic panel   Result Value Ref Range    Sodium 138 136 - 145 mmol/L    Potassium 3.6 3.4 - 5.3 mmol/L    Chloride 98 98 - 107 mmol/L    Carbon Dioxide (CO2) 21 (L) 22 - 29 mmol/L    Anion Gap 19 (H) 7 - 15 mmol/L    Urea Nitrogen 24.5 (H) 6.0 - 20.0 mg/dL    Creatinine 0.85 0.67 - 1.17 mg/dL    Calcium 9.9 8.6 - 10.0 mg/dL    Glucose 113 (H) 70 - 99 mg/dL    Alkaline Phosphatase 84 40 - 129 U/L    AST 29 0 - 45 U/L    ALT 20 0 - 70 U/L    Protein Total 7.5 6.4 - 8.3 g/dL    Albumin 4.8 3.5 - 5.2 g/dL    Bilirubin Total 0.5 <=1.2 mg/dL    GFR Estimate >90 >60 mL/min/1.73m2   CBC with platelets and differential   Result Value Ref Range    WBC Count 13.7 (H) 4.0 - 11.0 10e3/uL    RBC Count 4.86 4.40 - 5.90 10e6/uL    Hemoglobin 14.7 13.3 - 17.7 g/dL    Hematocrit 42.6 40.0 - 53.0 %    MCV 88 78 - 100 fL    MCH 30.2 26.5 - 33.0 pg    MCHC 34.5 31.5 - 36.5 g/dL    RDW 13.2 10.0 - 15.0 %    Platelet Count 404 150 - 450 10e3/uL    % Neutrophils 60 %    % Lymphocytes 28 %    % Monocytes 9 %    % Eosinophils 2 %    % Basophils 1 %    % Immature Granulocytes 0 %    NRBCs per 100 WBC 0 <1 /100    Absolute Neutrophils 8.3 1.6 - 8.3 10e3/uL    Absolute Lymphocytes 3.8 0.8 - 5.3 10e3/uL    Absolute Monocytes 1.2 0.0 - 1.3 10e3/uL    Absolute Eosinophils 0.3 0.0 - 0.7 10e3/uL    Absolute Basophils 0.1 0.0 - 0.2 10e3/uL    Absolute Immature Granulocytes 0.1 <=0.4 10e3/uL    Absolute NRBCs 0.0 10e3/uL   UA with Microscopic reflex to Culture    Specimen: Urine, Midstream   Result Value Ref Range    Color Urine Yellow Colorless, Straw, Light Yellow, Yellow    Appearance Urine Slightly Cloudy (A) Clear    Glucose Urine Negative Negative  mg/dL    Bilirubin Urine Negative Negative    Ketones Urine Negative Negative mg/dL    Specific Gravity Urine 1.020 1.003 - 1.035    Blood Urine Negative Negative    pH Urine 7.0 5.0 - 7.0    Protein Albumin Urine 30 (A) Negative mg/dL    Urobilinogen Urine Normal Normal, 2.0 mg/dL    Nitrite Urine Negative Negative    Leukocyte Esterase Urine Negative Negative    Bacteria Urine Few (A) None Seen /HPF    Mucus Urine Present (A) None Seen /LPF    Amorphous Crystals Urine Few (A) None Seen /HPF    RBC Urine 12 (H) <=2 /HPF    WBC Urine 1 <=5 /HPF    Narrative    Urine Culture not indicated   CT Abdomen Pelvis w/o Contrast    Narrative    CT ABDOMEN AND PELVIS WITHOUT CONTRAST 8/28/2023 1:31 PM    CLINICAL HISTORY: Right sided abdominal pain extending into the  testicle.    TECHNIQUE: CT scan of the abdomen and pelvis was performed without IV  contrast. Multiplanar reformats were obtained. Dose reduction  techniques were used.  CONTRAST: None.  COMPARISON: None.    FINDINGS:   LOWER CHEST: The visualized lung bases are clear.    HEPATOBILIARY: A 1.4 cm hypodensity in the posterior segment of the  right hepatic lobe is not definitively characterized, but is  unchanged, and likely represents a cyst. No calcified gallstones.    PANCREAS: Normal.    SPLEEN: Normal.    ADRENAL GLANDS: Normal.    KIDNEYS/BLADDER: Obstructing 0.3 cm stone at the right ureterovesical  junction causes mild right hydronephrosis and perinephric stranding.  There are at least three nonobstructing stones in each kidney,  measuring 0.2 cm or smaller. No ureteral calculi or hydronephrosis on  the left.    BOWEL: No bowel obstruction. Scattered sigmoid diverticulosis. No  convincing evidence for colitis or diverticulitis. Unremarkable  appendix.    PELVIC ORGANS: Unremarkable.    LYMPH NODES: No enlarged lymph nodes are identified in the abdomen or  pelvis.    VASCULATURE: Unremarkable.    ADDITIONAL FINDINGS: None.    MUSCULOSKELETAL: Degenerative  changes at the lumbosacral interspace.      Impression    IMPRESSION:   1.  Obstructing 0.3 cm stone at the right ureterovesical junction  causes mild right hydronephrosis.  2.  Scattered small nonobstructing stones in both kidneys measures 0.2  cm or smaller.       Medications   ondansetron (ZOFRAN) injection 4 mg (4 mg Intravenous $Given 8/28/23 1232)   0.9% sodium chloride BOLUS (0 mLs Intravenous Stopped 8/28/23 1434)   ketorolac (TORADOL) injection 15 mg (15 mg Intravenous $Given 8/28/23 1230)   HYDROmorphone (PF) (DILAUDID) injection 0.5 mg (0.5 mg Intravenous $Given 8/28/23 1434)       Assessments & Plan (with Medical Decision Making)  38-year-old male with abrupt onset right testicular pain.  Testicular exam is normal.  There does not appear to be any swelling or epididymal fullness on scrotal exam.  He is in significant amount of discomfort and is sweating.  He stated the last time he had a kidney stone with disc amount of pain he was sweating as well.  No history of any fevers at home.  Most likely this is related to ureterolithiasis although it is difficult to say at this time.  IV was established, IV fluids, Toradol, Dilaudid, Zofran given.  Urinalysis and labs drawn.  Urinalysis shows no signs of infection but he does have hematuria.  We proceeded with CT scan to rule out ureterolithiasis and found a 0.3 cm stone at the right UVJ consistent with his symptoms.  He has bilateral intrarenal small stones as well.  He does have a mild leukocytosis.  Patient did have good relief with the above treatment.  Patient is much more comfortable at time of discharge.  Expected course, medications prescribed, return to ER precautions discussed with a competent patient and his significant other who agree with the plan.  All questions answered prior to discharge     I have reviewed the nursing notes.    I have reviewed the findings, diagnosis, plan and need for follow up with the patient.                  New  Prescriptions    ONDANSETRON (ZOFRAN ODT) 4 MG ODT TAB    Take 1 tablet (4 mg) by mouth every 8 hours as needed for nausea    OXYCODONE (ROXICODONE) 5 MG TABLET    Take 1 tablet (5 mg) by mouth every 6 hours as needed for pain       Final diagnoses:   Ureterolithiasis       8/28/2023   [facility] EMERGENCY DEPT       Maurilio Ballesteros MD  08/28/23 [id number]

## 2023-08-28 NOTE — ED TRIAGE NOTES
Reports sudden onset right testicular swelling and pain.      Triage Assessment       Row Name 08/28/23 [id number]       Triage Assessment (Adult)    Airway WDL WDL       Respiratory WDL    Respiratory WDL WDL       Skin Circulation/Temperature WDL    Skin Circulation/Temperature WDL WDL       Cardiac WDL    Cardiac WDL WDL       Peripheral/Neurovascular WDL    Peripheral Neurovascular WDL WDL       Cognitive/Neuro/Behavioral WDL    Cognitive/Neuro/Behavioral WDL WDL

## 2024-08-11 ENCOUNTER — HEALTH MAINTENANCE LETTER (OUTPATIENT)
Age: 39
End: 2024-08-11

## 2025-08-17 ENCOUNTER — HEALTH MAINTENANCE LETTER (OUTPATIENT)
Age: 40
End: 2025-08-17